# Patient Record
Sex: FEMALE | Race: WHITE | Employment: UNEMPLOYED | URBAN - METROPOLITAN AREA
[De-identification: names, ages, dates, MRNs, and addresses within clinical notes are randomized per-mention and may not be internally consistent; named-entity substitution may affect disease eponyms.]

---

## 2022-07-11 ENCOUNTER — HOSPITAL ENCOUNTER (OUTPATIENT)
Age: 37
Setting detail: SPECIMEN
Discharge: HOME | End: 2022-07-11

## 2022-07-11 ENCOUNTER — OFFICE VISIT (OUTPATIENT)
Dept: OBGYN CLINIC | Age: 37
End: 2022-07-11
Payer: COMMERCIAL

## 2022-07-11 VITALS
BODY MASS INDEX: 20.72 KG/M2 | WEIGHT: 148 LBS | DIASTOLIC BLOOD PRESSURE: 60 MMHG | HEIGHT: 71 IN | SYSTOLIC BLOOD PRESSURE: 110 MMHG

## 2022-07-11 DIAGNOSIS — Z79.899 MEDICATION MANAGEMENT: ICD-10-CM

## 2022-07-11 DIAGNOSIS — N92.6 IRREGULAR PERIODS/MENSTRUAL CYCLES: ICD-10-CM

## 2022-07-11 DIAGNOSIS — Z01.419 ENCOUNTER FOR ANNUAL ROUTINE GYNECOLOGICAL EXAMINATION: Primary | ICD-10-CM

## 2022-07-11 PROCEDURE — 99385 PREV VISIT NEW AGE 18-39: CPT | Performed by: NURSE PRACTITIONER

## 2022-07-11 RX ORDER — NORETHINDRONE ACETATE AND ETHINYL ESTRADIOL 1; .02 MG/1; MG/1
1 TABLET ORAL DAILY
COMMUNITY

## 2022-07-11 RX ORDER — NORETHINDRONE ACETATE AND ETHINYL ESTRADIOL 1MG-20(21)
1 KIT ORAL DAILY
Qty: 3 PACKET | Refills: 3 | Status: SHIPPED | OUTPATIENT
Start: 2022-07-11

## 2022-07-11 RX ORDER — NORETHINDRONE ACETATE AND ETHINYL ESTRADIOL 1MG-20(21)
1 KIT ORAL DAILY
Qty: 1 PACKET | Refills: 3 | Status: CANCELLED | OUTPATIENT
Start: 2022-07-11

## 2022-07-11 RX ORDER — PNV NO.95/FERROUS FUM/FOLIC AC 28MG-0.8MG
TABLET ORAL
COMMUNITY

## 2022-07-11 RX ORDER — M-VIT,TX,IRON,MINS/CALC/FOLIC 27MG-0.4MG
1 TABLET ORAL DAILY
COMMUNITY

## 2022-07-11 ASSESSMENT — ENCOUNTER SYMPTOMS
VOMITING: 0
ABDOMINAL PAIN: 0
COLOR CHANGE: 0
NAUSEA: 0
SHORTNESS OF BREATH: 0
COUGH: 0

## 2022-07-11 ASSESSMENT — PATIENT HEALTH QUESTIONNAIRE - PHQ9
1. LITTLE INTEREST OR PLEASURE IN DOING THINGS: 0
SUM OF ALL RESPONSES TO PHQ QUESTIONS 1-9: 0
2. FEELING DOWN, DEPRESSED OR HOPELESS: 0
SUM OF ALL RESPONSES TO PHQ9 QUESTIONS 1 & 2: 0

## 2022-07-11 NOTE — PATIENT INSTRUCTIONS
Patient Education      Patient Education        Combination Birth Control Pills: Care Instructions  Overview     Combination birth control pills are used to prevent pregnancy. They give you aregular dose of the hormones estrogen and progestin. You take a pill every day to prevent pregnancy. Birth control pills come in packs. The most common type has 3 weeks of hormone pills. Some packs have sugar pills (they do not contain any hormones) for the fourth week. During that fourth no-hormone week, you have your period. Afterthe fourth week (28 days), you start a new pack. Some birth control pills are packaged in different ways. For example, some have hormone pills for the fourth week instead of sugar pills. This is called continuous use. Taking hormones for the entire month causes you to not have periods or to have fewer periods. Others are packaged so that you have a periodevery 3 months. Your doctor will tell you what type of pills you have. Follow-up care is a key part of your treatment and safety. Be sure to make and go to all appointments, and call your doctor if you are having problems. It's also a good idea to know your test results and keep alist of the medicines you take. How can you care for yourself at home? How do you take the pill?  Follow your doctor's instructions about when to start taking your pills. Use backup birth control, such as a condom, or don't have intercourse for 7 days after you start your pills.  Take your pills every day, at about the same time of day. To help yourself do this, try to take them when you do something else every day, such as brushing your teeth.  You can use the pill continuously and skip your period. When you get to the week that you take hormone-free pills, skip those pills and instead start right away on your next pill pack. Continue to take your pill every day. Talk to your doctor if you have any questions. What if you forget to take a pill?   Always read the protect against disease. When should you call for help? Call your doctor now or seek immediate medical care if:     You have severe belly pain.      You have signs of a blood clot, such as:  ? Pain in your calf, back of the knee, thigh, or groin. ? Redness and swelling in your leg or groin.      You have blurred vision or other problems seeing.      You have a severe headache.      You have severe trouble breathing. Watch closely for changes in your health, and be sure to contact your doctor if:     You think you might be pregnant.      You think you may be depressed.      You think you may have been exposed to or have a sexually transmitted infection. Where can you learn more? Go to https://FanSnappeVoltaixeweb.healthInCast. org and sign in to your [facility] account. Enter B879 in the kontoblick box to learn more about \"Combination Birth Control Pills: Care Instructions. \"     If you do not have an account, please click on the \"Sign Up Now\" link. Current as of: November 22, 2021               Content Version: 13.3  © 2006-2022 [employer]. Care instructions adapted under license by [facility] [place] ([facility]). If you have questions about a medical condition or this instruction, always ask your healthcare professional. [place] [address] any warranty or liability for your use of this information. Pap Test: Care Instructions  Overview     The Pap test (also called a Pap smear) is a screening test for cancer of the cervix, which is the lower part of the uterus that opens into the vagina. The test can help your doctor find early changes in the cells that could lead tocancer. The sample of cells taken during your test has been sent to a lab so that an expert can look at the cells. It usually takes a week or two to get the resultsback. Follow-up care is a key part of your treatment and safety.  Be sure to make and go to all appointments, and call your doctor if you are having problems. It's also a good idea to know your test results and keep alist of the medicines you take. What do the results mean?  A normal result means that the test did not find any abnormal cells in the sample.  An abnormal result can mean many things. Most of these are not cancer. The results of your test may be abnormal because:  ? You have an infection of the vagina or cervix, such as a yeast infection. ? You have low estrogen levels after menopause that are causing the cells to change. ? You have cell changes that may be a sign of precancer or cancer. The results are ranked based on how serious the changes might be. If the results were abnormal, you may need to have other tests. If the results show changes that could be a sign of cancer, you may need a test called acolposcopy, which provides a more complete view of the cervix. Sometimes the lab cannot use the sample because it does not contain enough cells or was not preserved well. If so, you may need to have the test again. This is not common, but it does happen from time to time. When should you call for help? Watch closely for changes in your health, and be sure to contact your doctor if:     You have vaginal bleeding or pain for more than 2 days after the test. It is normal to have a small amount of bleeding for a day or two after the test.   Where can you learn more? Go to https://Aunt Aggie's FoodspefabricioGera-IT.BooknGo. org and sign in to your [facility] account. Enter Q063 in the [facility] box to learn more about \"Pap Test: Care Instructions. \"     If you do not have an account, please click on the \"Sign Up Now\" link. Current as of: September 8, 2021               Content Version: 13.3  © 2147-8217 Healthwise, [employer]. Care instructions adapted under license by [address]  If you have questions about a medical condition or this instruction, always ask your healthcare professional. Jimena Hart disclaims any warranty or liability for your use of this information.

## 2022-07-11 NOTE — PROGRESS NOTES
[facility]  [address]  [address]  Dept: [phone]  Dept Fax: [phone]    Janel Chiang is a 30 y.o. female who presents today for her medical conditions/complaintsas noted below. Janel Chiang is c/o of New Patient (last pap- last year in [place])    ***    HPI:     HPI    Janel Chiang is here to discuss *** periods and treatment options. Her cycle is every *** days and lasts *** days. HX previous contraception usage ***   Menses age ***  LMP ***  Sexually active***, 1st coitus ***, number of partners***  Last PAP***  Hx of STI ***      She reports there is a personal history or family history of:    Smoking (> 15 cigs/day): {YES/NO:19670}    Migraine with Aura: {YES/NO:19670}    HTN (> 160/100):  {YES/NO:19670}    DVT:  {YES/NO:19670}    Thrombophilias:  {YES/NO:19670}    Stroke (CVA): {YES/NO:19670}    Ischemic heart disease:  {YES/NO:19670}    Valvular heart disease (A Fib, Pul HTN, etc): {YES/NO:19670}    Positive Antiphospholipid Abs:  {YES/NO:19670}    Liver Disease:  {YES/NO:19670}        No past medical history on file. No past surgical history on file. No family history on file. Social History     Tobacco Use    Smoking status: Not on file    Smokeless tobacco: Not on file   Substance Use Topics    Alcohol use: Not on file      Current Outpatient Medications   Medication Sig Dispense Refill    Multiple Vitamins-Minerals (THERAPEUTIC MULTIVITAMIN-MINERALS) tablet Take 1 tablet by mouth daily      Ferrous Sulfate (IRON) 325 (65 Fe) MG TABS Take by mouth       No current facility-administered medications for this visit.      Allergies   Allergen Reactions    Morphine Itching       Health Maintenance   Topic Date Due    Varicella vaccine (1 of 2 - 2-dose childhood series) Never done    COVID-19 Vaccine (1) Never done    Depression Screen  Never done    HIV screen  Never done    Hepatitis C screen  Never done    DTaP/Tdap/Td vaccine (1 - Tdap) Never done    Cervical cancer screen  Never done    Flu vaccine (1) 09/01/2022    Hepatitis A vaccine  Aged Out    Hepatitis B vaccine  Aged Out    Hib vaccine  Aged Out    Meningococcal (ACWY) vaccine  Aged Out    Pneumococcal 0-64 years Vaccine  Aged Out       :     Review of Systems    Objective:     Physical Exam  Ht 5' 11\" (1.803 m)     Assessment:      {No diagnosis found. (Refresh or delete this SmartLink)}    :        1.  Mateo Tatum  was seen today for her complaint. She was counseled on the various forms of contraception, including the male forms, the reversible forms and the non-reversible forms as needed. She desired to ***  She was made aware that hormone-based contraception may increase her risk of developing a blood clot which may in turn increase both her mortality and morbidity risks. The life-threatening side effects discussed included SOB, chest pains, severe HA's and/or calf pain (ACHES). She was advised that if these occur, she is to stop her contraception, notify our office and present to the ER for evaluation. She is *** a smoker: As a smoker, she was informed that smoking may further increase these risks. She was encouraged to stop smoking and information was provided. She was instructed to use Barrier protection for STI prevention at all times. She was informed of potential drug interaction with antibiotics, herbals and seizure medications and was instructed to use barrier protection as secondary back up as needed. 2.  She was advised of the need for yearly follow up, has not had pap. 3.  She will return in 3 months for follow up and evaluation of hormone use. No follow-ups on file. No orders of the defined types were placed in this encounter. No orders of the defined types were placed in this encounter. Patient given educational materials - seepatient instructions.   Discussed use, benefit, and side effects of prescribed medications. All patient questions answered. Pt voiced understanding. Reviewed health maintenance. Instructed to continue current medications, diet and exercise. Patient agreedwith treatment plan. Follow up as directed.       Electronically signed by ANSON Ramsay CNP on 7/11/2022at 3:02 PM

## 2022-07-11 NOTE — PROGRESS NOTES
Thalia Moreno is a 30 y.o.  here for her annual exam.  The patient was seen and examined. The patients past medical, surgical, social and family history were reviewed. Current medications and allergies were reviewed, and documented in the chart. She is  with 2 children. HEr and  run dairy farm in . They moved last year from [address] [place] [address] abuse No    Last PAP: Spring 2021- states was normal, do not have results, hx of abnormal PAP no  Family hx uterine or ovarian cancer-denies  Family hx of breast cancer -deneis  family hx colon cancer -denies        Sexually active: yes,  Dyspareunia: No, Vaginal discharge: no,  UTI symptoms: no, voiding difficulties: no, bowels regular:Yes bloating:no      Menstrual history:  Menarche age- states in middle school had first period then was very active sports, runner through high school did not have regular periods. She sates became more regular when she went to college. She states now on OCP for years d/t irregular periods and takes continuously and rarely has periods/spotting. She states sometimes notes moodiness but not all the time. denies hx of blood clot or clotting disorder or migraines with aura. Denies chest pain or pressure, SOB, calf pain or swelling, headaches, or vision changes. OB History    Para Term  AB Living   2 2 1 1   2   SAB IAB Ectopic Molar Multiple Live Births                    # Outcome Date GA Lbr Isidro/2nd Weight Sex Delivery Anes PTL Lv   2      M CS-LTranv      1 Term     F CS-LTranv          Vitals:    22 1501   BP: 110/60   Site: Right Upper Arm   Position: Sitting   Cuff Size: Small Adult   Weight: 148 lb (67.1 kg)   Height: 5' 11\" (1.803 m)       Wt Readings from Last 3 Encounters:   22 148 lb (67.1 kg)     History reviewed. No pertinent past medical history.                                                                 Past Surgical History:   Procedure Laterality Date     SECTION      x2     History reviewed. No pertinent family history. Social History     Tobacco Use   Smoking Status Never Smoker   Smokeless Tobacco Never Used     Social History     Substance and Sexual Activity   Alcohol Use Yes        Social History     Tobacco History     Smoking Status  Never Smoker    Smokeless Tobacco Use  Never Used          Alcohol History     Alcohol Use Status  Yes          Drug Use     Drug Use Status  Never          Sexual Activity     Sexually Active  Yes Partners  Male              Allergies   Allergen Reactions    Morphine Itching     Current Outpatient Medications   Medication Sig Dispense Refill    Multiple Vitamins-Minerals (THERAPEUTIC MULTIVITAMIN-MINERALS) tablet Take 1 tablet by mouth daily      Ferrous Sulfate (IRON) 325 (65 Fe) MG TABS Take by mouth      norethindrone-ethinyl estradiol (MICROGESTIN) 1-20 MG-MCG per tablet Take 1 tablet by mouth daily      norethindrone-ethinyl estradiol (LOESTRIN FE 1/20) 1-20 MG-MCG per tablet Take 1 tablet by mouth daily 3 packet 3     No current facility-administered medications for this visit. Subjective:     Review of Systems   Constitutional: Negative for chills and fever. Respiratory: Negative for cough and shortness of breath. Cardiovascular: Negative for chest pain, palpitations and leg swelling. Gastrointestinal: Negative for abdominal pain, nausea and vomiting. Genitourinary: Negative for dyspareunia, dysuria, flank pain, menstrual problem, pelvic pain, vaginal bleeding, vaginal discharge and vaginal pain. Skin: Negative for color change and rash. Neurological: Negative for dizziness, light-headedness and headaches. Hematological: Negative for adenopathy. Does not bruise/bleed easily. Psychiatric/Behavioral: Negative for self-injury and suicidal ideas. Objective:     Physical Exam  Vitals and nursing note reviewed. Constitutional:       General: She is not in acute distress. periods/menstrual cycles         Breast exam completed. Pelvic exam pap smear collected and sent. Cultures sent No    Plan:   Collect pap   BSE reviewed, Mammogram ordered: no  STD prevention reviewed  Gardisil counseling completed for all patients 10-37 yo  Cultures declined   BC -ocp information provided though in regards to IUD if she would decide to switch. DVT/ACHEs signs reviewed with patient. Refill medication if appropriate  Diet & Exercise reviewed with pt. Preventive  Health through PCP   RV prn/annual           Orders Placed This Encounter   Procedures    PAP SMEAR     Patient History:    No LMP recorded. OBGYN Status: unknown  Past Surgical History:  No date:  SECTION      Comment:  x2  Medications/Contraceptives Affecting Cytology     Combination Contraceptives - Oral Disp Start End     norethindrone-ethinyl estradiol (Palmira Carrasco) 1-20 MG-MCG per tablet          Class: Historical Med       Social History    Tobacco Use      Smoking status: Never Smoker      Smokeless tobacco: Never Used       Standing Status:   Future     Standing Expiration Date:   2023     Order Specific Question:   Collection Type     Answer: Thin Prep     Order Specific Question:   Prior Abnormal Pap Test     Answer:   No     Order Specific Question:   Screening or Diagnostic     Answer:   Screening     Order Specific Question:   HPV Requested? Answer:   Yes     Order Specific Question:   High Risk Patient     Answer:   N/A     Orders Placed This Encounter   Medications    norethindrone-ethinyl estradiol (LOESTRIN FE ) 1-20 MG-MCG per tablet     Sig: Take 1 tablet by mouth daily     Dispense:  3 packet     Refill:  3       Patient given educational materials - seepatient instructions. Discussed use, benefit, and side effects of prescribed medications. All patient questions answered. Pt voiced understanding. Reviewed health maintenance. Instructed to continue current medications, diet and exercise. Patient agreedwith treatment plan. Follow up as directed.       Electronically signed by ANSON Mckenzie CNP on 7/11/2022at 4:55 PM

## 2022-07-13 LAB
HPV SAMPLE: NORMAL
HPV, GENOTYPE 16: NOT DETECTED
HPV, GENOTYPE 18: NOT DETECTED
HPV, HIGH RISK OTHER: NOT DETECTED
HPV, INTERPRETATION: NORMAL
SPECIMEN DESCRIPTION: NORMAL

## 2022-07-15 LAB — CYTOLOGY REPORT: NORMAL

## 2023-06-21 RX ORDER — ESTAZOLAM 2 MG/1
TABLET ORAL
Qty: 63 TABLET | Refills: 0 | OUTPATIENT
Start: 2023-06-21

## 2023-07-17 NOTE — TELEPHONE ENCOUNTER
Pt needs refill on her birth control. She is due for her annual last one 7/11/22. Pt scheduled for annual on 9/13 and states she has no concerns.  Advised her she might need to move appt up if  its to far out from last.

## 2023-07-19 RX ORDER — NORETHINDRONE ACETATE AND ETHINYL ESTRADIOL 1MG-20(21)
1 KIT ORAL DAILY
Qty: 3 PACKET | Refills: 0 | Status: SHIPPED | OUTPATIENT
Start: 2023-07-19

## 2023-07-19 NOTE — TELEPHONE ENCOUNTER
So she just scheduled PAP in September on 7/17/23 so is she coming back here or not if not please cancel her appointment if so then I will refill her medication

## 2023-07-28 RX ORDER — ESTAZOLAM 2 MG/1
TABLET ORAL
Qty: 63 TABLET | Refills: 0 | OUTPATIENT
Start: 2023-07-28